# Patient Record
Sex: FEMALE | Race: WHITE | NOT HISPANIC OR LATINO | URBAN - METROPOLITAN AREA
[De-identification: names, ages, dates, MRNs, and addresses within clinical notes are randomized per-mention and may not be internally consistent; named-entity substitution may affect disease eponyms.]

---

## 2023-04-04 ENCOUNTER — EMERGENCY (EMERGENCY)
Facility: HOSPITAL | Age: 73
LOS: 1 days | Discharge: HOME | End: 2023-04-04
Attending: EMERGENCY MEDICINE | Admitting: STUDENT IN AN ORGANIZED HEALTH CARE EDUCATION/TRAINING PROGRAM
Payer: MEDICARE

## 2023-04-04 VITALS
RESPIRATION RATE: 16 BRPM | DIASTOLIC BLOOD PRESSURE: 60 MMHG | OXYGEN SATURATION: 97 % | SYSTOLIC BLOOD PRESSURE: 118 MMHG | HEART RATE: 74 BPM

## 2023-04-04 VITALS
OXYGEN SATURATION: 96 % | HEART RATE: 70 BPM | RESPIRATION RATE: 18 BRPM | HEIGHT: 64 IN | TEMPERATURE: 98 F | WEIGHT: 179.9 LBS | DIASTOLIC BLOOD PRESSURE: 71 MMHG | SYSTOLIC BLOOD PRESSURE: 117 MMHG

## 2023-04-04 LAB
ALBUMIN SERPL ELPH-MCNC: 3.4 G/DL — SIGNIFICANT CHANGE UP (ref 3.3–5)
ALP SERPL-CCNC: 75 U/L — SIGNIFICANT CHANGE UP (ref 40–120)
ALT FLD-CCNC: 21 U/L — SIGNIFICANT CHANGE UP (ref 12–78)
ANION GAP SERPL CALC-SCNC: 6 MMOL/L — SIGNIFICANT CHANGE UP (ref 5–17)
APTT BLD: 30.2 SEC — SIGNIFICANT CHANGE UP (ref 27.5–35.5)
AST SERPL-CCNC: 26 U/L — SIGNIFICANT CHANGE UP (ref 15–37)
BASOPHILS # BLD AUTO: 0.06 K/UL — SIGNIFICANT CHANGE UP (ref 0–0.2)
BASOPHILS NFR BLD AUTO: 0.5 % — SIGNIFICANT CHANGE UP (ref 0–2)
BILIRUB SERPL-MCNC: 0.5 MG/DL — SIGNIFICANT CHANGE UP (ref 0.2–1.2)
BUN SERPL-MCNC: 28 MG/DL — HIGH (ref 7–23)
CALCIUM SERPL-MCNC: 10.1 MG/DL — SIGNIFICANT CHANGE UP (ref 8.5–10.1)
CHLORIDE SERPL-SCNC: 97 MMOL/L — SIGNIFICANT CHANGE UP (ref 96–108)
CO2 SERPL-SCNC: 32 MMOL/L — HIGH (ref 22–31)
CREAT SERPL-MCNC: 5.6 MG/DL — HIGH (ref 0.5–1.3)
EGFR: 8 ML/MIN/1.73M2 — LOW
EOSINOPHIL # BLD AUTO: 0.11 K/UL — SIGNIFICANT CHANGE UP (ref 0–0.5)
EOSINOPHIL NFR BLD AUTO: 0.9 % — SIGNIFICANT CHANGE UP (ref 0–6)
GLUCOSE SERPL-MCNC: 145 MG/DL — HIGH (ref 70–99)
HCT VFR BLD CALC: 34.8 % — SIGNIFICANT CHANGE UP (ref 34.5–45)
HGB BLD-MCNC: 11.3 G/DL — LOW (ref 11.5–15.5)
IMM GRANULOCYTES NFR BLD AUTO: 0.6 % — SIGNIFICANT CHANGE UP (ref 0–0.9)
INR BLD: 1.07 RATIO — SIGNIFICANT CHANGE UP (ref 0.88–1.16)
LYMPHOCYTES # BLD AUTO: 1.56 K/UL — SIGNIFICANT CHANGE UP (ref 1–3.3)
LYMPHOCYTES # BLD AUTO: 13.1 % — SIGNIFICANT CHANGE UP (ref 13–44)
MCHC RBC-ENTMCNC: 32.5 GM/DL — SIGNIFICANT CHANGE UP (ref 32–36)
MCHC RBC-ENTMCNC: 32.6 PG — SIGNIFICANT CHANGE UP (ref 27–34)
MCV RBC AUTO: 100.3 FL — HIGH (ref 80–100)
MONOCYTES # BLD AUTO: 0.82 K/UL — SIGNIFICANT CHANGE UP (ref 0–0.9)
MONOCYTES NFR BLD AUTO: 6.9 % — SIGNIFICANT CHANGE UP (ref 2–14)
NEUTROPHILS # BLD AUTO: 9.3 K/UL — HIGH (ref 1.8–7.4)
NEUTROPHILS NFR BLD AUTO: 78 % — HIGH (ref 43–77)
NRBC # BLD: 0 /100 WBCS — SIGNIFICANT CHANGE UP (ref 0–0)
PLATELET # BLD AUTO: 179 K/UL — SIGNIFICANT CHANGE UP (ref 150–400)
POTASSIUM SERPL-MCNC: 4.3 MMOL/L — SIGNIFICANT CHANGE UP (ref 3.5–5.3)
POTASSIUM SERPL-SCNC: 4.3 MMOL/L — SIGNIFICANT CHANGE UP (ref 3.5–5.3)
PROT SERPL-MCNC: 8.1 G/DL — SIGNIFICANT CHANGE UP (ref 6–8.3)
PROTHROM AB SERPL-ACNC: 12.5 SEC — SIGNIFICANT CHANGE UP (ref 10.5–13.4)
RBC # BLD: 3.47 M/UL — LOW (ref 3.8–5.2)
RBC # FLD: 14.3 % — SIGNIFICANT CHANGE UP (ref 10.3–14.5)
SARS-COV-2 RNA SPEC QL NAA+PROBE: SIGNIFICANT CHANGE UP
SODIUM SERPL-SCNC: 135 MMOL/L — SIGNIFICANT CHANGE UP (ref 135–145)
TROPONIN I, HIGH SENSITIVITY RESULT: 30.7 NG/L — SIGNIFICANT CHANGE UP
WBC # BLD: 11.92 K/UL — HIGH (ref 3.8–10.5)
WBC # FLD AUTO: 11.92 K/UL — HIGH (ref 3.8–10.5)

## 2023-04-04 PROCEDURE — 72125 CT NECK SPINE W/O DYE: CPT | Mod: 26,MA

## 2023-04-04 PROCEDURE — 85730 THROMBOPLASTIN TIME PARTIAL: CPT

## 2023-04-04 PROCEDURE — 82962 GLUCOSE BLOOD TEST: CPT

## 2023-04-04 PROCEDURE — 73060 X-RAY EXAM OF HUMERUS: CPT

## 2023-04-04 PROCEDURE — 87635 SARS-COV-2 COVID-19 AMP PRB: CPT

## 2023-04-04 PROCEDURE — 93010 ELECTROCARDIOGRAM REPORT: CPT

## 2023-04-04 PROCEDURE — 99285 EMERGENCY DEPT VISIT HI MDM: CPT | Mod: 25

## 2023-04-04 PROCEDURE — 73030 X-RAY EXAM OF SHOULDER: CPT | Mod: 26,RT

## 2023-04-04 PROCEDURE — 70450 CT HEAD/BRAIN W/O DYE: CPT | Mod: 26,MA

## 2023-04-04 PROCEDURE — 85610 PROTHROMBIN TIME: CPT

## 2023-04-04 PROCEDURE — 85025 COMPLETE CBC W/AUTO DIFF WBC: CPT

## 2023-04-04 PROCEDURE — 84484 ASSAY OF TROPONIN QUANT: CPT

## 2023-04-04 PROCEDURE — 70450 CT HEAD/BRAIN W/O DYE: CPT | Mod: MA

## 2023-04-04 PROCEDURE — 99285 EMERGENCY DEPT VISIT HI MDM: CPT

## 2023-04-04 PROCEDURE — 36415 COLL VENOUS BLD VENIPUNCTURE: CPT

## 2023-04-04 PROCEDURE — 93005 ELECTROCARDIOGRAM TRACING: CPT

## 2023-04-04 PROCEDURE — 80053 COMPREHEN METABOLIC PANEL: CPT

## 2023-04-04 PROCEDURE — 73080 X-RAY EXAM OF ELBOW: CPT

## 2023-04-04 PROCEDURE — 73060 X-RAY EXAM OF HUMERUS: CPT | Mod: 26,RT

## 2023-04-04 PROCEDURE — 73080 X-RAY EXAM OF ELBOW: CPT | Mod: 26,RT

## 2023-04-04 PROCEDURE — 70486 CT MAXILLOFACIAL W/O DYE: CPT | Mod: 26,MA

## 2023-04-04 PROCEDURE — 97162 PT EVAL MOD COMPLEX 30 MIN: CPT

## 2023-04-04 PROCEDURE — 72125 CT NECK SPINE W/O DYE: CPT | Mod: MA

## 2023-04-04 PROCEDURE — 99282 EMERGENCY DEPT VISIT SF MDM: CPT

## 2023-04-04 PROCEDURE — 73030 X-RAY EXAM OF SHOULDER: CPT

## 2023-04-04 PROCEDURE — 70486 CT MAXILLOFACIAL W/O DYE: CPT | Mod: MA

## 2023-04-04 RX ORDER — ASPIRIN/CALCIUM CARB/MAGNESIUM 324 MG
1 TABLET ORAL
Refills: 0 | DISCHARGE

## 2023-04-04 RX ORDER — CALCIUM ACETATE 667 MG
3 TABLET ORAL
Refills: 0 | DISCHARGE

## 2023-04-04 RX ORDER — ATORVASTATIN CALCIUM 80 MG/1
1 TABLET, FILM COATED ORAL
Refills: 0 | DISCHARGE

## 2023-04-04 RX ORDER — AMLODIPINE BESYLATE 2.5 MG/1
1 TABLET ORAL
Refills: 0 | DISCHARGE

## 2023-04-04 RX ORDER — TRAMADOL HYDROCHLORIDE 50 MG/1
1 TABLET ORAL
Qty: 15 | Refills: 0
Start: 2023-04-04

## 2023-04-04 RX ORDER — ACETAMINOPHEN 500 MG
650 TABLET ORAL ONCE
Refills: 0 | Status: COMPLETED | OUTPATIENT
Start: 2023-04-04 | End: 2023-04-04

## 2023-04-04 RX ADMIN — Medication 650 MILLIGRAM(S): at 10:13

## 2023-04-04 NOTE — SOCIAL WORK PROGRESS NOTE - NSSWPROGRESSNOTE_GEN_ALL_CORE
SW met with pt again to discuss Pt recommendation for GI[facility] . Pt more alert. Pt states she does  not want to go to [facility] and prefers to go home. She states she has help at home from her family.  Sw  has attempted to reach her emergency contact with no success.  Lm for her brother. SW to continue to follow.

## 2023-04-04 NOTE — ED PROVIDER NOTE - CARE PROVIDER_API CALL
Brennan Huynh)  [facility]  [address] [place] [address], [address] [place] [address]  Phone: [phone]  Fax: [phone]  Follow Up Time:

## 2023-04-04 NOTE — ED PROVIDER NOTE - CLINICAL SUMMARY MEDICAL DECISION MAKING FREE TEXT BOX
Right-hand-dominant female who has pain and deformity with guarding to the right upper extremity.  Rule out fracture, rule out dislocation.  The extremity is neurologic and vascular intact.  There is no evidence of head injury nor is there any history of head injury CT imaging of the brain is not needed.  Patient denies any chest wall tenderness and there is no chest wall injury to evaluation.  With bilateral breath sounds.  No imaging of the chest is needed.  Analgesia, sling, x-ray imaging and repeat evaluation for gait assessment.  This chart was made with dictation software and may contain typographical errors.

## 2023-04-04 NOTE — CONSULT NOTE ADULT - SUBJECTIVE AND OBJECTIVE BOX
Pt Name: SHWETA CHANDRA    MRN: [id number]    HPI: Patient is a 73y Female with presents to the ED s/p trip and fall at her dialysis center. Currently complains of right shoulder pain, declines any other injuries, head trauma, LOC. States she baseline ambulates with a walker for the last 3 years. Has been getting dialysis for 7 years. She is right hand dominate. Lives with her brother and has help around the house.  history of jessa nevus     PAST MEDICAL & SURGICAL HISTORY:      Allergies: ChloraPrep One-Step (Unknown)  Penciclovir (Unknown)  penicillin G benzathine (Unknown)  Sulfac 10% (Unknown)  Sulfacetamide Sodium (Unknown)      Ambulation: Baseline Ambulation  With Walker       PHYSICAL EXAM:    Vital Signs Last 24 Hrs  T(C): 36.8 (04 Apr 2023 09:21), Max: 36.8 (04 Apr 2023 09:21)  T(F): 98.2 (04 Apr 2023 09:21), Max: 98.2 (04 Apr 2023 09:21)  HR: 70 (04 Apr 2023 09:21) (70 - 70)  BP: 117/71 (04 Apr 2023 09:21) (117/71 - 117/71)  BP(mean): --  RR: 18 (04 Apr 2023 09:21) (18 - 18)  SpO2: 96% (04 Apr 2023 09:21) (96% - 96%)    Parameters below as of 04 Apr 2023 09:21  Patient On (Oxygen Delivery Method): room air      Physical Exam:  General: NAD, Alert, Awake and oriented  Respiratory: Symmetric chest wall expansion bilaterally, no accessory muscle use  Skin- intact   Abdomen Soft and NT/ND     RIGHT UE: No open skin. right dialysis line. Tenderness right arm, No ROM due pain. NT right elbow with FROM. NT right wrist with FROM. good movement of the fingers. decrease NVI distal hand, states that her baseline for the last 3 years. distal pulse palpable     Secondary Survey:   RLE/LLE/LUE: No TTP over bony prominences, SILT, palpable pulses, full/painless range of motion, compartments soft    Spine: No bony tenderness. No palpable stepoffs.    Imaging:   xray shows a right proximal humerus fracture, follow up official read     Orthopedic A/P:    Pt is a  73y Female with right proximal humerus fracture     -Pain control PRN  -NWB right arm   -keep in sling at all times   -Follow up with Dr. Reno/Dr Gaming within 1 week. Please call the office to make an appointment.   -Discussed with Dr. Huynh who is aware and approves of plan.

## 2023-04-04 NOTE — ED ADULT NURSE NOTE - NSIMPLEMENTINTERV_GEN_ALL_ED
Implemented All Fall Risk Interventions:  Hydro to call system. Call bell, personal items and telephone within reach. Instruct patient to call for assistance. Room bathroom lighting operational. Non-slip footwear when patient is off stretcher. Physically safe environment: no spills, clutter or unnecessary equipment. Stretcher in lowest position, wheels locked, appropriate side rails in place. Provide visual cue, wrist band, yellow gown, etc. Monitor gait and stability. Monitor for mental status changes and reorient to person, place, and time. Review medications for side effects contributing to fall risk. Reinforce activity limits and safety measures with patient and family.

## 2023-04-04 NOTE — ED ADULT NURSE REASSESSMENT NOTE - NS ED NURSE REASSESS COMMENT FT1
Spoke w/ patient. Patient states her brother is the only one that can pick her up. Patient states she will call her brother around 2:30pm to arrange for  because he is not available right now.

## 2023-04-04 NOTE — ED PROVIDER NOTE - PROGRESS NOTE DETAILS
orthopedics paged per NOTE from pt, pt needs sabine,  sw notified and is on case.  covid test ordered. pt met with sw refused sabine, will dc pt was watiing all day for family member to pick her up after work. she was getting gchanged and dressed by the ed tech when she became suddenly unresponsive.  on reeval pt states she feel sfine  her family member was called and stated "she does this" vomiting, drooling and passing out and had her bp re-adjusted over the past year to address this issue.  pt has swelling to r upper lip and bruising to eye and face. will get ct's as these bruses are coming more apparent.  pt denies cp no sob pt was waiting all day for family member to pick her up after work. she was getting changed and dressed by the ed tech when she became suddenly unresponsive.  on reeval pt states she feels fine  her family member was called and stated "she does this" vomiting, drooling and passing out and had her bp re-adjusted over the past year to address this issue.  pt has swelling to r upper lip and bruising to eye and face. will get ct's as these bruses are coming more apparent.  pt denies cp no sob s/o pending ct and lab results. results discussed with patient and family rehab placement offered and declined, they have PMD follow up and will reach out to dialysis center to get appropriate transportation.

## 2023-04-04 NOTE — PROVIDER CONTACT NOTE (CHANGE IN STATUS NOTIFICATION) - SITUATION
While getting dress for discharge Patient synopsized in the bed lasted 1/2 second. Er Tech was at bedside helping the patient to get dressed.

## 2023-04-04 NOTE — ED PROVIDER NOTE - CARE PLAN
1 Principal Discharge DX:	Closed fracture of right proximal humerus   Principal Discharge DX:	Closed fracture of right proximal humerus  Secondary Diagnosis:	Syncope and collapse  Secondary Diagnosis:	Syncope and collapse

## 2023-04-04 NOTE — ED ADULT TRIAGE NOTE - CHIEF COMPLAINT QUOTE
c/o trip and fall at dialysis today, c/o R arm pain. denies LOC, head trauma, dizziness, lightheadedness, anticoagulant use. completed full dialysis tx (M,W,F).

## 2023-04-04 NOTE — ED PROVIDER NOTE - NSFOLLOWUPINSTRUCTIONS_ED_ALL_ED_FT
Follow-up with orthopedics  Tylenol for pain  For severe pain tramadol with caution as this will make you dizzy, lightheaded.  Use the sling at all times unless bathing  Ice to the shoulder 10 minutes 5 times a day  Follow-up with your primary care physician  If you develop pins-and-needles, weakness or numbness, or any concerning symptoms return to the emergency room by calling 911  Humerus Fracture Treated With Immobilization  Right arm and hand showing skeleton with a humerus fracture.  The humerus is the large bone in the upper arm. A broken (fractured) humerus is often treated by wearing a cast, splint, or sling. This holds the broken pieces in place (immobilization) so they can heal.    What are the causes?  This condition may be caused by:  A fall.  A hard, direct hit to the arm.  A car accident.  What increases the risk?  You are more likely to develop this condition if:  You have a disease that makes the bones thin and weak.  You are elderly.  What are the signs or symptoms?  Pain.  Swelling.  Bruising.  Not being able to move your arm normally.  How is this treated?  Treatment involves wearing a cast, splint, or sling until your arm heals enough for you to begin range-of-motion exercises. You may also be prescribed pain medicine.    Follow these instructions at home:  If you have a cast or splint that cannot be taken off:    Do not put pressure on any part of the cast or splint until it is fully hardened. This may take several hours.  Do not stick anything inside the cast or splint to scratch your skin.  Check the skin around the cast or splint every day. Tell your doctor if you see problems.  You may put lotion on dry skin around the cast or splint. Do not put lotion on the skin under the cast or splint.  Keep the cast clean and dry.  If you have a splint or sling that can be taken off:    Wear the splint or sling as told by your doctor. Remove it only as told by your doctor.  Loosen the splint or sling if your fingers:  Tingle.  Become numb.  Turn cold and blue.  Keep the splint or sling clean and dry.  Bathing    Do not take baths, swim, or use a hot tub. Ask your doctor about taking showers or sponge baths.  If your cast, splint, or sling is not waterproof:  Do not let it get wet.  Cover it with a watertight covering when you take a bath or shower.  Managing pain, stiffness, and swelling    Bag of ice on a towel on the skin.  If told, put ice on the injured area. To do this:  If you have a removable splint or sling, take it off as told by your doctor.  Put ice in a plastic bag.  Place a towel between your skin and the bag or between your cast or splint that you cannot take off and the bag.  Leave the ice on for 20 minutes, 2–3 times a day.  Take off the ice if your skin turns bright red. This is very important. If you cannot feel pain, heat, or cold, you have a greater risk of damage to the area.  Move your fingers often.  Raise the injured area above the level of your heart while you are sitting or lying down.  Driving    Do not drive or use machines while taking prescription pain medicine.  Ask your doctor when it is safe to drive if you have a cast, splint, or sling on your arm.  Activity    Do not lift anything until your doctor says that it is safe.  Return to your normal activities when your doctor says that it is safe.  Do range-of-motion exercises only as told by your doctor.  General instructions    Do not smoke or use any products that contain nicotine or tobacco. These can make it take longer for your bones to heal. If you need help quitting, ask your doctor.  Take over-the-counter and prescription medicines only as told by your doctor.  If told, take steps to prevent problems with pooping (constipation). You may need to:  Drink enough fluid to keep your pee (urine) pale yellow.  Take medicines. You will be told what medicines to take.  Eat foods that are high in fiber. These include beans, whole grains, and fresh fruits and vegetables.  Limit foods that are high in fat and sugar. These include fried or sweet foods.  Keep all follow-up visits.  Contact a doctor if:  You have any new pain, swelling, or bruising.  Your pain, swelling, and bruising do not get better.  Your cast, splint, or sling becomes loose or damaged.  Get help right away if:  Your skin or fingers on your injured arm turn blue or gray.  Your arm is cold or numb.  You have very bad pain in your injured arm.  Summary  The humerus is the large bone in the upper arm.  A broken humerus is often treated by wearing a cast, splint, or sling.  Wear a splint or sling as told by your doctor. Remove it only as told by your doctor.  Move your fingers often.  This information is not intended to replace advice given to you by your health care provider. Make sure you discuss any questions you have with your health care provider. Follow-up with your PMD and with orthopedics  Tylenol for pain  For severe pain tramadol with caution as this will make you dizzy, lightheaded.  Use the sling at all times unless bathing  Ice to the shoulder 10 minutes 5 times a day  Follow-up with your primary care physician  If you develop pins-and-needles, weakness or numbness, or any concerning symptoms return to the emergency room by calling 911  Humerus Fracture Treated With Immobilization  Right arm and hand showing skeleton with a humerus fracture.  The humerus is the large bone in the upper arm. A broken (fractured) humerus is often treated by wearing a cast, splint, or sling. This holds the broken pieces in place (immobilization) so they can heal.    What are the causes?  This condition may be caused by:  A fall.  A hard, direct hit to the arm.  A car accident.  What increases the risk?  You are more likely to develop this condition if:  You have a disease that makes the bones thin and weak.  You are elderly.  What are the signs or symptoms?  Pain.  Swelling.  Bruising.  Not being able to move your arm normally.  How is this treated?  Treatment involves wearing a cast, splint, or sling until your arm heals enough for you to begin range-of-motion exercises. You may also be prescribed pain medicine.    Follow these instructions at home:  If you have a cast or splint that cannot be taken off:    Do not put pressure on any part of the cast or splint until it is fully hardened. This may take several hours.  Do not stick anything inside the cast or splint to scratch your skin.  Check the skin around the cast or splint every day. Tell your doctor if you see problems.  You may put lotion on dry skin around the cast or splint. Do not put lotion on the skin under the cast or splint.  Keep the cast clean and dry.  If you have a splint or sling that can be taken off:    Wear the splint or sling as told by your doctor. Remove it only as told by your doctor.  Loosen the splint or sling if your fingers:  Tingle.  Become numb.  Turn cold and blue.  Keep the splint or sling clean and dry.  Bathing    Do not take baths, swim, or use a hot tub. Ask your doctor about taking showers or sponge baths.  If your cast, splint, or sling is not waterproof:  Do not let it get wet.  Cover it with a watertight covering when you take a bath or shower.  Managing pain, stiffness, and swelling    Bag of ice on a towel on the skin.  If told, put ice on the injured area. To do this:  If you have a removable splint or sling, take it off as told by your doctor.  Put ice in a plastic bag.  Place a towel between your skin and the bag or between your cast or splint that you cannot take off and the bag.  Leave the ice on for 20 minutes, 2–3 times a day.  Take off the ice if your skin turns bright red. This is very important. If you cannot feel pain, heat, or cold, you have a greater risk of damage to the area.  Move your fingers often.  Raise the injured area above the level of your heart while you are sitting or lying down.  Driving    Do not drive or use machines while taking prescription pain medicine.  Ask your doctor when it is safe to drive if you have a cast, splint, or sling on your arm.  Activity    Do not lift anything until your doctor says that it is safe.  Return to your normal activities when your doctor says that it is safe.  Do range-of-motion exercises only as told by your doctor.  General instructions    Do not smoke or use any products that contain nicotine or tobacco. These can make it take longer for your bones to heal. If you need help quitting, ask your doctor.  Take over-the-counter and prescription medicines only as told by your doctor.  If told, take steps to prevent problems with pooping (constipation). You may need to:  Drink enough fluid to keep your pee (urine) pale yellow.  Take medicines. You will be told what medicines to take.  Eat foods that are high in fiber. These include beans, whole grains, and fresh fruits and vegetables.  Limit foods that are high in fat and sugar. These include fried or sweet foods.  Keep all follow-up visits.  Contact a doctor if:  You have any new pain, swelling, or bruising.  Your pain, swelling, and bruising do not get better.  Your cast, splint, or sling becomes loose or damaged.  Get help right away if:  Your skin or fingers on your injured arm turn blue or gray.  Your arm is cold or numb.  You have very bad pain in your injured arm.  Summary  The humerus is the large bone in the upper arm.  A broken humerus is often treated by wearing a cast, splint, or sling.  Wear a splint or sling as told by your doctor. Remove it only as told by your doctor.  Move your fingers often.  This information is not intended to replace advice given to you by your health care provider. Make sure you discuss any questions you have with your health care provider.    ====================================================================   Syncope    WHAT YOU NEED TO KNOW:    Syncope is also called fainting or passing out. Syncope is a sudden, temporary loss of consciousness, followed by a fall from a standing or sitting position. Syncope ranges from not serious to a sign of a more serious condition that needs to be treated. You can control some health conditions that cause syncope. Your healthcare providers can help you create a plan to manage syncope and prevent episodes.    DISCHARGE INSTRUCTIONS:    Seek care immediately if:     You are bleeding because you hit your head when you fainted.       You suddenly have double vision, difficulty speaking, numbness, and cannot move your arms or legs.      You have chest pain and trouble breathing.      You vomit blood or material that looks like coffee grounds.      You see blood in your bowel movement.    Contact your healthcare provider if:     You have new or worsening symptoms.      You have another syncope episode.      You have a headache, fast heartbeat, or feel too dizzy to stand up.      You have questions or concerns about your condition or care.    Medicines:     Medicines may be needed to help your heart pump strongly and regularly. Your healthcare provider may also make changes to any medicines that are causing syncope.       Take your medicine as directed. Contact your healthcare provider if you think your medicine is not helping or if you have side effects. Tell him or her if you are allergic to any medicine. Keep a list of the medicines, vitamins, and herbs you take. Include the amounts, and when and why you take them. Bring the list or the pill bottles to follow-up visits. Carry your medicine list with you in case of an emergency.    Follow up with your healthcare provider as directed: Write down your questions so you remember to ask them during your visits.     Manage syncope:     Keep a record of your syncope episodes. Include your symptoms and your activity before and after the episode. The record can help your healthcare provider find the cause of your syncope and help you manage episodes.      Sit or lie down when needed. This includes when you feel dizzy, your throat is getting tight, and your vision changes. Raise your legs above the level of your heart.      Take slow, deep breaths if you start to breathe faster with anxiety or fear. This can help decrease dizziness and the feeling that you might faint.       Check your blood pressure often. This is important if you take medicine to lower your blood pressure. Check your blood pressure when you are lying down and when you are standing. Ask how often to check during the day. Keep a record of your blood pressure numbers. Your healthcare provider may use the record to help plan your treatment.How to take a Blood Pressure         Prevent a syncope episode:     Move slowly and let yourself get used to one position before you move to another position. This is very important when you change from a lying or sitting position to a standing position. Take some deep breaths before you stand up from a lying position. Stand up slowly. Sudden movements may cause a fainting spell. Sit on the side of the bed or couch for a few minutes before you stand up. If you are on bedrest, try to be upright for about 2 hours each day, or as directed. Do not lock your legs if you are standing for a long period of time. Move your legs and bend your knees to keep blood flowing.      Follow your healthcare provider's recommendations. Your provider may recommend that you drink more liquids to prevent dehydration. You may also need to have more salt to keep your blood pressure from dropping too low and causing syncope. Your provider will tell you how much liquid and sodium to have each day. He or she will also tell you how much physical activity is safe for you. This will depend on what is causing your syncope.      Watch for signs of low blood sugar. These include hunger, nervousness, sweating, and fast or fluttery heartbeats. Talk with your healthcare provider about ways to keep your blood sugar level steady.      Do not strain if you are constipated. You may faint if you strain to have a bowel movement. Walking is the best way to get your bowels moving. Eat foods high in fiber to make it easier to have a bowel movement. Good examples are high-fiber cereals, beans, vegetables, and whole-grain breads. Prune juice may help make bowel movements softer.      Be careful in hot weather. Heat can cause a syncope episode. Limit activity done outside on hot days. Physical activity in hot weather can lead to dehydration. This can cause an episode.

## 2023-04-04 NOTE — CARE COORDINATION ASSESSMENT. - NSCAREPROVIDERS_GEN_ALL_CORE_FT
CARE PROVIDERS:  Administration: Yariel Montesinos  Administration: Quintin Calixto  Admitting: Doctor, Unknown  Attending: , Moi  Consultant: Katja Kwon  Consultant: Lopez Diamond  ED Attending: Lex Edouard  ED Nurse: Lavonne Richardson  ED Nurse 2: Bandar Hodges  Ordered: ADM, User  Ordered: ServiceAccount, SCMMLM  Ordered: ServiceAccount, SCMMLM  Ordered: ServiceAccount, SCMMLM  : Mabel Barfield// Supp. Assoc.: Caryn Mccartney

## 2023-04-04 NOTE — ED ADULT NURSE REASSESSMENT NOTE - NS ED NURSE REASSESS COMMENT FT1
While getting dressed for discharge patient synopsized for 1/2 second in the bed. ER Tech was at bedside helping the patient to get dressed. Just before patient was synopsized patient was on the phone with brother. As per patient she passes out more frequently and come back to normal. Patient is alert and awake now. Labs done. Hooked the patient to cardiac monitor. Dr. Edouard evaluated the patient. Brother is on his way to visit the patient.

## 2023-04-04 NOTE — CARE COORDINATION ASSESSMENT. - OTHER PERTINENT REFERRAL INFORMATION
Sw met with pt at bedside. Pt with some forgetfulness which per chart is the usual after HD. Pt states she lives at home with her brother and his wife.  Pt receives HD Tuesday, Thursday and Saturday at [facility]. Pt states she gets an Amb[facility] to transport her via [facility]. Sw unable to reach pts family at this time. Pt has recommneded GI[facility]GIO but pt declines discharge education at this time. She states once she feels better she will do better with PT. SW to follow for safe discharge plan and support.  Sw met with pt at bedside. Pt with some forgetfulness which per chart is the usual after HD. Pt states she lives at home with her brother and his wife.  Pt receives HD Tuesday, Thursday and Saturday at [facility]. Pt states she gets an Amb[facility] to transport her via [facility]. Sw unable to reach pts family at this time. Pt ambulates with a rolling walker at baseline. Pt has recommended GIORGIO but pt declines discharge education at this time. She states once she feels better she will do better with PT. SW to follow for safe discharge plan and support.

## 2023-04-04 NOTE — ED PROVIDER NOTE - ENMT, MLM
Airway patent, Nasal mucosa clear. Mouth with normal mucosa. Throat has no vesicles, no oropharyngeal exudates and uvula is midline.  no g fr

## 2023-04-04 NOTE — ED ADULT NURSE NOTE - OBJECTIVE STATEMENT
Spoke with patient A & O to person & president only. Patient unable to tell the correct year or location of where patient is. Patient states she is shaken up from dialysis & states some days she doesn't remember things after dialysis. Patient states she got up from completing dialysis around 9:15am & she tripped & fell/landed on her right arm. Patient denies hitting head or LOC. Patient denies use of blood thinner medications. Patient has dark areas in the sclera of both eyes and states that has been there since birth. Patient states pain is 4/10 in the right upper arm that is sore.

## 2023-04-04 NOTE — PHYSICAL THERAPY INITIAL EVALUATION ADULT - PERTINENT HX OF CURRENT PROBLEM, REHAB EVAL
Patient is a 73-year-old female who undergoes hemodialysis 3 times a week.  Had just completed her full dialysis treatment this morning, was getting on the scale to get her weight after her dialysis tripped and fell on the scale falling on her outstretched right arm.  She complained of sudden onset of pain to her right humerus.  She denies striking her head.  No loss of consciousness. Brought in by emergency ambulance for evaluation.  According to the paramedic or transported the patient she usually has postdialysis confusion, but the patient is now awake alert and oriented.  She denies any other symptoms.

## 2023-04-04 NOTE — ED PROVIDER NOTE - MUSCULOSKELETAL, MLM
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

## 2023-08-30 NOTE — ED PROVIDER NOTE - PATIENT PORTAL LINK FT
Capillary refill less/equal to 2 seconds You can access the FollowMyHealth Patient Portal offered by [facility] by registering at the following website: [email or web address]. By joining [employer]’s FollowMyHealth portal, you will also be able to view your health information using other applications (apps) compatible with our system. You can access the FollowMyHealth Patient Portal offered by [facility] by registering at the following website: [email or web address]. By joining [employer]’s FollowMyHealth portal, you will also be able to view your health information using other applications (apps) compatible with our system.

## 2024-03-05 ENCOUNTER — EMERGENCY (EMERGENCY)
Facility: HOSPITAL | Age: 74
LOS: 1 days | Discharge: HOME | End: 2024-03-05
Attending: STUDENT IN AN ORGANIZED HEALTH CARE EDUCATION/TRAINING PROGRAM | Admitting: EMERGENCY MEDICINE
Payer: MEDICARE

## 2024-03-05 VITALS
TEMPERATURE: 98 F | SYSTOLIC BLOOD PRESSURE: 180 MMHG | HEIGHT: 62 IN | RESPIRATION RATE: 18 BRPM | OXYGEN SATURATION: 100 % | WEIGHT: 178.57 LBS | DIASTOLIC BLOOD PRESSURE: 80 MMHG | HEART RATE: 76 BPM

## 2024-03-05 VITALS — SYSTOLIC BLOOD PRESSURE: 188 MMHG | DIASTOLIC BLOOD PRESSURE: 81 MMHG

## 2024-03-05 LAB
ALBUMIN SERPL ELPH-MCNC: 3.4 G/DL — SIGNIFICANT CHANGE UP (ref 3.3–5)
ALP SERPL-CCNC: 91 U/L — SIGNIFICANT CHANGE UP (ref 40–120)
ALT FLD-CCNC: 14 U/L — SIGNIFICANT CHANGE UP (ref 12–78)
ANION GAP SERPL CALC-SCNC: 11 MMOL/L — SIGNIFICANT CHANGE UP (ref 5–17)
AST SERPL-CCNC: 23 U/L — SIGNIFICANT CHANGE UP (ref 15–37)
BASOPHILS # BLD AUTO: 0.02 K/UL — SIGNIFICANT CHANGE UP (ref 0–0.2)
BASOPHILS NFR BLD AUTO: 0.3 % — SIGNIFICANT CHANGE UP (ref 0–2)
BILIRUB SERPL-MCNC: 0.6 MG/DL — SIGNIFICANT CHANGE UP (ref 0.2–1.2)
BUN SERPL-MCNC: 27 MG/DL — HIGH (ref 7–23)
CALCIUM SERPL-MCNC: 9 MG/DL — SIGNIFICANT CHANGE UP (ref 8.5–10.1)
CHLORIDE SERPL-SCNC: 98 MMOL/L — SIGNIFICANT CHANGE UP (ref 96–108)
CO2 SERPL-SCNC: 27 MMOL/L — SIGNIFICANT CHANGE UP (ref 22–31)
CREAT SERPL-MCNC: 4.3 MG/DL — HIGH (ref 0.5–1.3)
EGFR: 10 ML/MIN/1.73M2 — LOW
EOSINOPHIL # BLD AUTO: 0.12 K/UL — SIGNIFICANT CHANGE UP (ref 0–0.5)
EOSINOPHIL NFR BLD AUTO: 1.6 % — SIGNIFICANT CHANGE UP (ref 0–6)
GLUCOSE SERPL-MCNC: 108 MG/DL — HIGH (ref 70–99)
HCT VFR BLD CALC: 32.2 % — LOW (ref 34.5–45)
HGB BLD-MCNC: 10.4 G/DL — LOW (ref 11.5–15.5)
IMM GRANULOCYTES NFR BLD AUTO: 0.5 % — SIGNIFICANT CHANGE UP (ref 0–0.9)
LACTATE SERPL-SCNC: 1.3 MMOL/L — SIGNIFICANT CHANGE UP (ref 0.7–2)
LIDOCAIN IGE QN: 95 U/L — HIGH (ref 13–75)
LYMPHOCYTES # BLD AUTO: 0.46 K/UL — LOW (ref 1–3.3)
LYMPHOCYTES # BLD AUTO: 6.3 % — LOW (ref 13–44)
MAGNESIUM SERPL-MCNC: 2.1 MG/DL — SIGNIFICANT CHANGE UP (ref 1.6–2.6)
MANUAL SMEAR VERIFICATION: SIGNIFICANT CHANGE UP
MCHC RBC-ENTMCNC: 30.1 PG — SIGNIFICANT CHANGE UP (ref 27–34)
MCHC RBC-ENTMCNC: 32.3 GM/DL — SIGNIFICANT CHANGE UP (ref 32–36)
MCV RBC AUTO: 93.1 FL — SIGNIFICANT CHANGE UP (ref 80–100)
MONOCYTES # BLD AUTO: 0.29 K/UL — SIGNIFICANT CHANGE UP (ref 0–0.9)
MONOCYTES NFR BLD AUTO: 4 % — SIGNIFICANT CHANGE UP (ref 2–14)
NEUTROPHILS # BLD AUTO: 6.36 K/UL — SIGNIFICANT CHANGE UP (ref 1.8–7.4)
NEUTROPHILS NFR BLD AUTO: 87.3 % — HIGH (ref 43–77)
NRBC # BLD: 0 /100 WBCS — SIGNIFICANT CHANGE UP (ref 0–0)
PLAT MORPH BLD: NORMAL — SIGNIFICANT CHANGE UP
PLATELET # BLD AUTO: 129 K/UL — LOW (ref 150–400)
POTASSIUM SERPL-MCNC: 4.5 MMOL/L — SIGNIFICANT CHANGE UP (ref 3.5–5.3)
POTASSIUM SERPL-SCNC: 4.5 MMOL/L — SIGNIFICANT CHANGE UP (ref 3.5–5.3)
PROT SERPL-MCNC: 8 G/DL — SIGNIFICANT CHANGE UP (ref 6–8.3)
RBC # BLD: 3.46 M/UL — LOW (ref 3.8–5.2)
RBC # FLD: 17.2 % — HIGH (ref 10.3–14.5)
RBC BLD AUTO: SIGNIFICANT CHANGE UP
SODIUM SERPL-SCNC: 136 MMOL/L — SIGNIFICANT CHANGE UP (ref 135–145)
TROPONIN I, HIGH SENSITIVITY RESULT: 31.5 NG/L — SIGNIFICANT CHANGE UP
WBC # BLD: 7.29 K/UL — SIGNIFICANT CHANGE UP (ref 3.8–10.5)
WBC # FLD AUTO: 7.29 K/UL — SIGNIFICANT CHANGE UP (ref 3.8–10.5)

## 2024-03-05 PROCEDURE — 96375 TX/PRO/DX INJ NEW DRUG ADDON: CPT

## 2024-03-05 PROCEDURE — 85025 COMPLETE CBC W/AUTO DIFF WBC: CPT

## 2024-03-05 PROCEDURE — 99285 EMERGENCY DEPT VISIT HI MDM: CPT

## 2024-03-05 PROCEDURE — 36415 COLL VENOUS BLD VENIPUNCTURE: CPT

## 2024-03-05 PROCEDURE — 99285 EMERGENCY DEPT VISIT HI MDM: CPT | Mod: 25

## 2024-03-05 PROCEDURE — 96374 THER/PROPH/DIAG INJ IV PUSH: CPT

## 2024-03-05 PROCEDURE — 84484 ASSAY OF TROPONIN QUANT: CPT

## 2024-03-05 PROCEDURE — 93005 ELECTROCARDIOGRAM TRACING: CPT

## 2024-03-05 PROCEDURE — 71045 X-RAY EXAM CHEST 1 VIEW: CPT | Mod: 26

## 2024-03-05 PROCEDURE — 80053 COMPREHEN METABOLIC PANEL: CPT

## 2024-03-05 PROCEDURE — 71045 X-RAY EXAM CHEST 1 VIEW: CPT

## 2024-03-05 PROCEDURE — 83605 ASSAY OF LACTIC ACID: CPT

## 2024-03-05 PROCEDURE — 83690 ASSAY OF LIPASE: CPT

## 2024-03-05 PROCEDURE — 93010 ELECTROCARDIOGRAM REPORT: CPT

## 2024-03-05 PROCEDURE — 83735 ASSAY OF MAGNESIUM: CPT

## 2024-03-05 PROCEDURE — 96372 THER/PROPH/DIAG INJ SC/IM: CPT | Mod: XU

## 2024-03-05 RX ORDER — ONDANSETRON 8 MG/1
4 TABLET, FILM COATED ORAL ONCE
Refills: 0 | Status: COMPLETED | OUTPATIENT
Start: 2024-03-05 | End: 2024-03-05

## 2024-03-05 RX ORDER — HYDRALAZINE HCL 50 MG
10 TABLET ORAL ONCE
Refills: 0 | Status: COMPLETED | OUTPATIENT
Start: 2024-03-05 | End: 2024-03-05

## 2024-03-05 RX ORDER — FAMOTIDINE 10 MG/ML
20 INJECTION INTRAVENOUS ONCE
Refills: 0 | Status: COMPLETED | OUTPATIENT
Start: 2024-03-05 | End: 2024-03-05

## 2024-03-05 RX ADMIN — FAMOTIDINE 20 MILLIGRAM(S): 10 INJECTION INTRAVENOUS at 10:41

## 2024-03-05 RX ADMIN — Medication 10 MILLIGRAM(S): at 19:45

## 2024-03-05 RX ADMIN — ONDANSETRON 4 MILLIGRAM(S): 8 TABLET, FILM COATED ORAL at 10:41

## 2024-03-05 RX ADMIN — ONDANSETRON 4 MILLIGRAM(S): 8 TABLET, FILM COATED ORAL at 21:51

## 2024-03-05 NOTE — ED PROVIDER NOTE - CLINICAL SUMMARY MEDICAL DECISION MAKING FREE TEXT BOX
73 yo F PMH ESRD on HD TTS via right sided chest port x years presents BIBEMS from HD center s/p full HD session with nausea and vomiting, pt unable to quantify episodes, NBNB. denies fever, abd pain, dysuria.   vss; hypertensive   tired appearing  abd soft ntnd    low suspicion for surgical abd, appendicitis, cholecystitis, perf, colitis, diverticulitis, aortic catastrophe, ovarian pathology.   consider gastritis, viral syndrome, pna, acs, pancreatitis     will get ekg, cxr, labs, ua, zofran, pepcid, reassess  will defer fluids for now since s/p HD today; if fluids required, will give small bolus ie 250cc.

## 2024-03-05 NOTE — ED PROVIDER NOTE - NSFOLLOWUPINSTRUCTIONS_ED_ALL_ED_FT
Nausea / Vomiting    Nausea is the feeling that you have to vomit. As nausea gets worse, it can lead to vomiting. Vomiting puts you at an increased risk for dehydration. Older adults and people with other diseases or a weak immune system are at higher risk for dehydration. Drink clear fluids in small but frequent amounts as tolerated. Eat bland, easy-to-digest foods in small amounts as tolerated.    SEEK IMMEDIATE MEDICAL CARE IF YOU HAVE ANY OF THE FOLLOWING SYMPTOMS: fever, inability to keep sufficient fluids down, black or bloody vomitus, black or bloody stools, lightheadedness/dizziness, chest pain, severe headache, rash, shortness of breath, cold or clammy skin, confusion, pain with urination, or any signs of dehydration.    1) Follow-up with your Primary Medical Doctor or referred doctor within 1 week unless earlier was specified. Call today / next business day for prompt follow-up.  2) Return to Emergency room for any worsening or persistent pain, weakness, fever, or any other concerning symptoms.  3) See attached instruction sheets for additional information, including information regarding signs and symptoms to look out for, reasons to seek immediate care and other important instructions.  4) Follow-up with any specialists as discussed / noted as well.  5)  all medications, if any, that were sent to the pharmacy.

## 2024-03-05 NOTE — ED PROVIDER NOTE - OBJECTIVE STATEMENT
73 yo F PMH ESRD on HD TTS via right chest port x years presents BIBEMS s/p full HD session c/o nausea and vomiting which began after the session completed. NBNB, pt is unable to quantify the number of episodes of vomiting. denies fever, diarrhea, cp, sob, coughing, sneezing, rhinorrhea. pt admits making some urine still, denies dysuria. denies sick contacts. lives at home with brother. 73 yo F PMH ESRD on HD TTS via right chest port x years presents BIBEMS s/p full HD session c/o nausea and vomiting which began after the session completed a/w 1 episode of brief syncope, no trauma. NBNB, pt is unable to quantify the number of episodes of vomiting. denies fever, diarrhea, cp, sob, coughing, sneezing, rhinorrhea. pt admits making some urine still, denies dysuria. denies sick contacts. lives at home with brother.  HPI per ems, pt and brother bonnie over phone.

## 2024-03-05 NOTE — ED PROVIDER NOTE - PATIENT PORTAL LINK FT
You can access the FollowMyHealth Patient Portal offered by [facility] by registering at the following website: [email or web address]. By joining [employer]’s FollowMyHealth portal, you will also be able to view your health information using other applications (apps) compatible with our system.

## 2024-03-05 NOTE — ED PROVIDER NOTE - CARE PROVIDER_API CALL
Frederic Gardner  Gastroenterology  [address] [place] [address]  Phone: [phone]  Fax: [phone]  Follow Up Time: 7-10 Days

## 2024-03-05 NOTE — ED ADULT NURSE NOTE - OBJECTIVE STATEMENT
pt BIBEMS to ED from dialysis center. pt a&ox3, clear speech, presenting with nausea. per pt, pt suddenly felt nauseous and vomited after completing dialysis treatment. pt states she was asymptomatic prior to dialysis. pt BP elevated, otherwise VSS. pt denies any other sx, including HA/dizziness, abdominal/flank/back pain, CP. abdomen soft/nontender... dialysis port noted RT IJ/neck.. pending MD/PA eval/orders pt BIBEMS to ED from dialysis center. pt with clear speech, presenting with nausea/vomiting. per pt, pt suddenly felt nauseous and vomited after completing dialysis treatment. pt states she was asymptomatic prior to dialysis. pt BP elevated, otherwise VSS. pt denies any other sx, including HA/dizziness, abdominal/flank/back pain, CP. abdomen soft/nontender... dialysis port noted RT neck.. pending MD/PA eval/orders

## 2024-03-05 NOTE — ED PROVIDER NOTE - PHYSICAL EXAMINATION
Constitutional: Awake, Alert. NAD. Tired appearing, well nourished. Cooperative. VS- hypertensive  HEAD: NC/AT; no signs of trauma   EYES: Conjunctiva and sclera are clear bilaterally. EOMI. PERRL. No nystagmus.  ENT: MMM. No rhinorrhea, normal pharynx, oropharynx patent, no tonsillar exudates or enlargement, no erythema, no drooling or stridor.   NECK: FROM. Supple. No nuchal rigidity. No midline or paraspinal TTP.  CARDIOVASCULAR: RRR, Normal S1, S2. No audible murmurs. No chest wall ttp. Radial pulses +2 B/L. hypertensive  RESPIRATORY: Speaking full sentences. Normal respiratory effort; breath sounds CTAB, No WRR. No accessory muscle use.   ABDOMEN: Soft; NTND. No CVAT B/L. +BS x4 quadrants. Negative McBurney's point ttp, Negative raygoza's sign.  EXTREMITIES: Full active ROM in all extremities; no deformities; non-tender to palpation; no edema, no crepitus or step off;  distal pulses palpable and symmetric.  SKIN: Warm, dry; good skin turgor, no apparent lesions or rashes, no ecchymosis, brisk capillary refill.  NEURO: AAOx3 follows commands. No facial droop. No truncal ataxia. Moving all extremities spontaneously. Sensation intact B/L.

## 2024-03-05 NOTE — ED ADULT NURSE NOTE - NSFALLRISKINTERV_ED_ALL_ED
Assistance OOB with selected safe patient handling equipment if applicable/Assistance with ambulation/Communicate fall risk and risk factors to all staff, patient, and family/Provide patient with walking aids/Provide visual cue: yellow wristband, yellow gown, etc/Reinforce activity limits and safety measures with patient and family/Call bell, personal items and telephone in reach/Instruct patient to call for assistance before getting out of bed/chair/stretcher/Non-slip footwear applied when patient is off stretcher/[place] to call system/Physically safe environment - no spills, clutter or unnecessary equipment/Purposeful Proactive Rounding/Room/bathroom lighting operational, light cord in reach

## 2024-03-05 NOTE — ED PROVIDER NOTE - PROGRESS NOTE DETAILS
Reevaluated patient at bedside.  Patient feeling well. Tolerated po. abd remains soft ntnd. Discussed the results of all diagnostic testing in ED and copies of all reports given. Labs grossly unremarkable.  An opportunity to ask questions was given.  Discussed the importance of prompt, close medical follow-up.  Patient will return with any changes, concerns or persistent / worsening symptoms.  Understanding of all instructions verbalized. dc w pmd f/u Based on the H&P, I do not suspect any life- / limb- threatening pathology that requires further intervention at this time. d/w brother Frank on phone results and plan of care. he was concerned for progressive weakness x 2-3 weeks. also he was concerend for today's sxs and this was similar to prior visit when her bp was low. I explained that likley during HD fluid shift likley caused some transient hypotn as well as nausea, vomiting and syncope; ed visit pt remained nonfocal exam and no hypotn . low susp for acute stroke or cva. advised f/u within 1 week w pmd and consider pt. brother agreed.

## 2024-03-05 NOTE — ED ADULT TRIAGE NOTE - GLASGOW COMA SCALE: BEST VERBAL RESPONSE, MLM
: Paolo Irene M.D  Date of procedure: 3/3/2022  Post-operative Diagnosis: AVNRT    Procedure Performed: RFA to the region of the slow pathway for treatment of AVNRT.     Description of Procedure: The patient was brought to the EP lab in the fasting state. Prepped and draped in sterile fashion. Safety timeout was performed. Sedation administered by anesthesia staff. Ultrasound guided venous access of the bilateral femoral veins was performed. HRA, HIS, and RV catheters placed via left femoral vein access. CS and Ablation catheters via right femoral vein access. Diagnostic EP study confirmed dual AV node physiology. 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 RFA to the slow pathway for treatment of AVNRT. All catheters and sheaths withdrawn and patient was moved to recovery in stable condition.     EBL: <10 mL    Specimens Removed: None  Complications: no immediate    Plan:  Post op orders including bedrest x 4 hours  Resume routine home mediations  Start and continue aspirin 81 mg x 4 weeks  Outpatient follow up with Dr. Maria Victoria Cotter   EP fellow      (V5) oriented

## 2025-02-24 NOTE — ED ADULT NURSE NOTE - WAS YOUR LAST COVID-19 VACCINE GREATER THAN OR EQUAL TO TWO MONTHS AGO?
AT BEDSIDE TO PACU RN
CHLORAPREP TO LOWER BACK PER LORENZA SINGLETON RN
Dressing applied to insertion site.
No reactions from rocephin noted, discharge with instructions
Yes